# Patient Record
Sex: MALE | Race: OTHER | NOT HISPANIC OR LATINO | URBAN - METROPOLITAN AREA
[De-identification: names, ages, dates, MRNs, and addresses within clinical notes are randomized per-mention and may not be internally consistent; named-entity substitution may affect disease eponyms.]

---

## 2019-10-29 ENCOUNTER — EMERGENCY (EMERGENCY)
Facility: HOSPITAL | Age: 34
LOS: 1 days | Discharge: HOME | End: 2019-10-29
Admitting: EMERGENCY MEDICINE
Payer: MEDICAID

## 2019-10-29 VITALS
DIASTOLIC BLOOD PRESSURE: 68 MMHG | OXYGEN SATURATION: 97 % | SYSTOLIC BLOOD PRESSURE: 113 MMHG | HEIGHT: 70 IN | WEIGHT: 160.06 LBS | HEART RATE: 73 BPM | RESPIRATION RATE: 15 BRPM | TEMPERATURE: 98 F

## 2019-10-29 LAB
ALBUMIN SERPL ELPH-MCNC: 4.2 G/DL — SIGNIFICANT CHANGE UP (ref 3.4–5)
ALP SERPL-CCNC: 81 U/L — SIGNIFICANT CHANGE UP (ref 40–120)
ALT FLD-CCNC: 28 U/L — SIGNIFICANT CHANGE UP (ref 12–42)
AMYLASE P1 CFR SERPL: 68 U/L — SIGNIFICANT CHANGE UP (ref 25–115)
ANION GAP SERPL CALC-SCNC: 8 MMOL/L — LOW (ref 9–16)
APTT BLD: 32.5 SEC — SIGNIFICANT CHANGE UP (ref 27.5–36.3)
AST SERPL-CCNC: 19 U/L — SIGNIFICANT CHANGE UP (ref 15–37)
BASOPHILS # BLD AUTO: 0.06 K/UL — SIGNIFICANT CHANGE UP (ref 0–0.2)
BASOPHILS NFR BLD AUTO: 0.7 % — SIGNIFICANT CHANGE UP (ref 0–2)
BILIRUB SERPL-MCNC: 0.6 MG/DL — SIGNIFICANT CHANGE UP (ref 0.2–1.2)
BUN SERPL-MCNC: 27 MG/DL — HIGH (ref 7–23)
CALCIUM SERPL-MCNC: 8.9 MG/DL — SIGNIFICANT CHANGE UP (ref 8.5–10.5)
CHLORIDE SERPL-SCNC: 103 MMOL/L — SIGNIFICANT CHANGE UP (ref 96–108)
CO2 SERPL-SCNC: 28 MMOL/L — SIGNIFICANT CHANGE UP (ref 22–31)
CREAT SERPL-MCNC: 0.94 MG/DL — SIGNIFICANT CHANGE UP (ref 0.5–1.3)
EOSINOPHIL # BLD AUTO: 0.08 K/UL — SIGNIFICANT CHANGE UP (ref 0–0.5)
EOSINOPHIL NFR BLD AUTO: 1 % — SIGNIFICANT CHANGE UP (ref 0–6)
GLUCOSE SERPL-MCNC: 88 MG/DL — SIGNIFICANT CHANGE UP (ref 70–99)
HCT VFR BLD CALC: 46.6 % — SIGNIFICANT CHANGE UP (ref 39–50)
HGB BLD-MCNC: 16.9 G/DL — SIGNIFICANT CHANGE UP (ref 13–17)
IMM GRANULOCYTES NFR BLD AUTO: 0.2 % — SIGNIFICANT CHANGE UP (ref 0–1.5)
INR BLD: 1.21 — HIGH (ref 0.88–1.16)
LIDOCAIN IGE QN: 166 U/L — SIGNIFICANT CHANGE UP (ref 73–393)
LYMPHOCYTES # BLD AUTO: 1.89 K/UL — SIGNIFICANT CHANGE UP (ref 1–3.3)
LYMPHOCYTES # BLD AUTO: 22.8 % — SIGNIFICANT CHANGE UP (ref 13–44)
MCHC RBC-ENTMCNC: 32.2 PG — SIGNIFICANT CHANGE UP (ref 27–34)
MCHC RBC-ENTMCNC: 36.3 GM/DL — HIGH (ref 32–36)
MCV RBC AUTO: 88.8 FL — SIGNIFICANT CHANGE UP (ref 80–100)
MONOCYTES # BLD AUTO: 0.56 K/UL — SIGNIFICANT CHANGE UP (ref 0–0.9)
MONOCYTES NFR BLD AUTO: 6.7 % — SIGNIFICANT CHANGE UP (ref 2–14)
NEUTROPHILS # BLD AUTO: 5.69 K/UL — SIGNIFICANT CHANGE UP (ref 1.8–7.4)
NEUTROPHILS NFR BLD AUTO: 68.6 % — SIGNIFICANT CHANGE UP (ref 43–77)
NRBC # BLD: 0 /100 WBCS — SIGNIFICANT CHANGE UP (ref 0–0)
PLATELET # BLD AUTO: 174 K/UL — SIGNIFICANT CHANGE UP (ref 150–400)
POTASSIUM SERPL-MCNC: 4.1 MMOL/L — SIGNIFICANT CHANGE UP (ref 3.5–5.3)
POTASSIUM SERPL-SCNC: 4.1 MMOL/L — SIGNIFICANT CHANGE UP (ref 3.5–5.3)
PROT SERPL-MCNC: 7.3 G/DL — SIGNIFICANT CHANGE UP (ref 6.4–8.2)
PROTHROM AB SERPL-ACNC: 13.4 SEC — HIGH (ref 10–12.9)
RBC # BLD: 5.25 M/UL — SIGNIFICANT CHANGE UP (ref 4.2–5.8)
RBC # FLD: 11.7 % — SIGNIFICANT CHANGE UP (ref 10.3–14.5)
SODIUM SERPL-SCNC: 139 MMOL/L — SIGNIFICANT CHANGE UP (ref 132–145)
WBC # BLD: 8.3 K/UL — SIGNIFICANT CHANGE UP (ref 3.8–10.5)
WBC # FLD AUTO: 8.3 K/UL — SIGNIFICANT CHANGE UP (ref 3.8–10.5)

## 2019-10-29 PROCEDURE — 99284 EMERGENCY DEPT VISIT MOD MDM: CPT

## 2019-10-29 PROCEDURE — 74177 CT ABD & PELVIS W/CONTRAST: CPT | Mod: 26

## 2019-10-29 RX ORDER — SODIUM CHLORIDE 9 MG/ML
1000 INJECTION INTRAMUSCULAR; INTRAVENOUS; SUBCUTANEOUS ONCE
Refills: 0 | Status: COMPLETED | OUTPATIENT
Start: 2019-10-29 | End: 2019-10-29

## 2019-10-29 RX ORDER — IOHEXOL 300 MG/ML
30 INJECTION, SOLUTION INTRAVENOUS ONCE
Refills: 0 | Status: COMPLETED | OUTPATIENT
Start: 2019-10-29 | End: 2019-10-29

## 2019-10-29 RX ADMIN — SODIUM CHLORIDE 1000 MILLILITER(S): 9 INJECTION INTRAMUSCULAR; INTRAVENOUS; SUBCUTANEOUS at 19:22

## 2019-10-29 RX ADMIN — IOHEXOL 30 MILLILITER(S): 300 INJECTION, SOLUTION INTRAVENOUS at 19:22

## 2019-10-29 NOTE — ED ADULT NURSE NOTE - NSIMPLEMENTINTERV_GEN_ALL_ED
Implemented All Universal Safety Interventions:  [place] to call system. Call bell, personal items and telephone within reach. Instruct patient to call for assistance. Room bathroom lighting operational. Non-slip footwear when patient is off stretcher. Physically safe environment: no spills, clutter or unnecessary equipment. Stretcher in lowest position, wheels locked, appropriate side rails in place.

## 2019-10-29 NOTE — ED PROVIDER NOTE - NSFOLLOWUPINSTRUCTIONS_ED_ALL_ED_FT
Abdominal Pain    Many things can cause abdominal pain. Many times, abdominal pain is not caused by a disease and will improve without treatment. Your health care provider will do a physical exam to determine if there is a dangerous cause of your pain; blood tests and imaging may help determine the cause of your pain. However, in many cases, no cause may be found and you may need further testing as an outpatient. Monitor your abdominal pain for any changes.     SEEK IMMEDIATE MEDICAL CARE IF YOU HAVE ANY OF THE FOLLOWING SYMPTOMS: worsening abdominal pain, uncontrollable vomiting, profuse diarrhea, inability to have bowel movements or pass gas, black or bloody stools, fever accompanying chest pain or back pain, or fainting. These symptoms may represent a serious problem that is an emergency. Do not wait to see if the symptoms will go away. Get medical help right away. Call 911 and do not drive yourself to the hospital.     FOLLOW UP WITH THE GI SPECIALIST. CALL TOMORROW FOR AN APPOINTMENT. RETURN TO ER ANY NEW OR CONCERNING SYMPTOMS INCLUDING FEVER, CHILLS, VOMITING, DIARRHEA, ANY OTHER CONCERNS.

## 2019-10-29 NOTE — ED PROVIDER NOTE - PATIENT PORTAL LINK FT
You can access the FollowMyHealth Patient Portal offered by [facility] by registering at the following website: [email or web address]. By joining [employer]’s FollowMyHealth portal, you will also be able to view your health information using other applications (apps) compatible with our system.

## 2019-10-29 NOTE — ED PROVIDER NOTE - PROGRESS NOTE DETAILS
rechecked pt - his pain has improved somewhat. went over all results. pt given copy. abd soft, nontender. will d/c with GI f/u.

## 2019-10-29 NOTE — ED PROVIDER NOTE - OBJECTIVE STATEMENT
33 y/o male with no significant PMHx, on Finasteride, presents to ED c/o abd pain since yesterday. Patient reports diffuse abd pain that became localized mainly in the right side while at work today. Reports associated nausea but denies any fever, chills, vomiting, diarrhea, or blood in the stool. States he went to [facility] where he was given Toradol with some symptomatic relief and told to come to ED to r/o appendicitis. Patient denies any abd surgeries, no similar symptoms in the past. Endorses some constipation in the past few days.

## 2019-10-29 NOTE — ED PROVIDER NOTE - GASTROINTESTINAL, MLM
Right mid abdominal tenderness, normoactive bowel sounds in all 4 quadrants. minimal right mid abdominal tenderness, normoactive bowel sounds in all 4 quadrants.

## 2019-10-29 NOTE — ED PROVIDER NOTE - CLINICAL SUMMARY MEDICAL DECISION MAKING FREE TEXT BOX
33 y/o otherwise healthy male presents with generalized abd pain that has since localized to the right side with associated nausea. Patient sent by [facility] to r/o appendicitis, had normal UA at [facility]. Will obtain labs and CT abd/pelvis. Patient offered nausea meds but declined at this time. 35 y/o otherwise healthy male presents with generalized abd pain that has since localized to the right side with associated nausea. Patient sent by [facility] to r/o appendicitis, had normal UA at [facility]. Will obtain labs and CT abd/pelvis. Patient offered nausea meds but declined at this time.    pt feeling well. labs and ctap unremarkable. pt feeling improved. will d/c with GI f/u.

## 2019-10-29 NOTE — ED PROVIDER NOTE - CARE PROVIDER_API CALL
Bryan King; MBBS)  Internal Medicine  [address] [place] [address]  Phone: [phone]  Fax: [phone]  Follow Up Time:

## 2019-10-30 VITALS
OXYGEN SATURATION: 97 % | SYSTOLIC BLOOD PRESSURE: 100 MMHG | HEART RATE: 70 BPM | DIASTOLIC BLOOD PRESSURE: 70 MMHG | RESPIRATION RATE: 16 BRPM | TEMPERATURE: 98 F

## 2019-10-31 ENCOUNTER — INBOUND DOCUMENT (OUTPATIENT)
Age: 34
End: 2019-10-31

## 2019-10-31 PROBLEM — Z00.00 ENCOUNTER FOR PREVENTIVE HEALTH EXAMINATION: Status: ACTIVE | Noted: 2019-10-31

## 2019-11-04 DIAGNOSIS — R11.0 NAUSEA: ICD-10-CM

## 2019-11-04 DIAGNOSIS — R10.9 UNSPECIFIED ABDOMINAL PAIN: ICD-10-CM

## 2019-11-04 DIAGNOSIS — R10.31 RIGHT LOWER QUADRANT PAIN: ICD-10-CM

## 2019-12-09 ENCOUNTER — APPOINTMENT (OUTPATIENT)
Dept: GASTROENTEROLOGY | Facility: CLINIC | Age: 34
End: 2019-12-09

## 2021-01-03 ENCOUNTER — EMERGENCY (EMERGENCY)
Facility: HOSPITAL | Age: 36
LOS: 1 days | Discharge: HOME | End: 2021-01-03
Admitting: EMERGENCY MEDICINE
Payer: MEDICAID

## 2021-01-03 VITALS
WEIGHT: 164.91 LBS | DIASTOLIC BLOOD PRESSURE: 80 MMHG | TEMPERATURE: 98 F | HEART RATE: 81 BPM | OXYGEN SATURATION: 96 % | SYSTOLIC BLOOD PRESSURE: 123 MMHG | HEIGHT: 70 IN | RESPIRATION RATE: 18 BRPM

## 2021-01-03 DIAGNOSIS — M54.2 CERVICALGIA: ICD-10-CM

## 2021-01-03 PROBLEM — Z78.9 OTHER SPECIFIED HEALTH STATUS: Chronic | Status: ACTIVE | Noted: 2019-10-30

## 2021-01-03 PROCEDURE — 72040 X-RAY EXAM NECK SPINE 2-3 VW: CPT | Mod: 26

## 2021-01-03 PROCEDURE — 99284 EMERGENCY DEPT VISIT MOD MDM: CPT | Mod: 25

## 2021-01-03 RX ORDER — IBUPROFEN 200 MG
1 TABLET ORAL
Qty: 21 | Refills: 0
Start: 2021-01-03 | End: 2021-01-09

## 2021-01-03 RX ORDER — METHOCARBAMOL 500 MG/1
2 TABLET, FILM COATED ORAL
Qty: 30 | Refills: 0
Start: 2021-01-03 | End: 2021-01-07

## 2021-01-03 NOTE — ED ADULT TRIAGE NOTE - CHIEF COMPLAINT QUOTE
here for pain to neck radiating down to right hand x 1 month after lifting weights. Now reports tingling to right arm x 3 days

## 2021-01-03 NOTE — ED PROVIDER NOTE - CARE PROVIDERS DIRECT ADDRESSES
[email or web address],DirectAddress_Unknown

## 2021-01-03 NOTE — ED ADULT NURSE NOTE - NSIMPLEMENTINTERV_GEN_ALL_ED
Implemented All Universal Safety Interventions:  [place] to call system. Call bell, personal items and telephone within reach. Instruct patient to call for assistance. Room bathroom lighting operational. Non-slip footwear when patient is off stretcher. Physically safe environment: no spills, clutter or unnecessary equipment. Stretcher in lowest position, wheels locked, appropriate side rails in place.

## 2021-01-03 NOTE — ED PROVIDER NOTE - PROVIDER TOKENS
PROVIDER:[TOKEN:[[id number]]],PROVIDER:[TOKEN:[[id number]]],PROVIDER:[TOKEN:[[id number]]],PROVIDER:[TOKEN:[[id number]]]

## 2021-01-03 NOTE — ED PROVIDER NOTE - PHYSICAL EXAMINATION
FROM x4, pain over R side of neck and top of shoulder worsened on internal rotation, nontender, skin intact

## 2021-01-03 NOTE — ED PROVIDER NOTE - CARE PROVIDER_API CALL
Andrew Esquivel)  PhysicalRehab Medicine  [address] [place] [address]  Phone: [phone]  Fax: [phone]  Follow Up Time:     Selena Lancaster)  PhysicalRehab Medicine  [address] [place] [address]  Phone: [phone]  Fax: [phone]  Follow Up Time:     Soumya Grissom)  Neurosurgery  [address] [place] [address], [address]  [place] [address]  Phone: [phone]  Fax: [phone]  Follow Up Time:     Emigdio Stauffer)  Pain Medicine; PhysicalRehab Medicine  [address] [place] [address]  Phone: [phone]  Fax: [phone]  Follow Up Time:

## 2021-01-03 NOTE — ED PROVIDER NOTE - HEME LYMPH, MLM
No adenopathy or splenomegaly. No cervical or inguinal lymphadenopathy.
no diaphoresis/no dizziness/no back pain/no fever/no cough/no syncope/no nausea/no shortness of breath/no vomiting/no chills

## 2021-01-03 NOTE — ED PROVIDER NOTE - CLINICAL SUMMARY MEDICAL DECISION MAKING FREE TEXT BOX
Patient presents with R sided neck pain with radiations to R arm, atraumatic, + repetative stress. xray WNL. rx sent for NSAIDs, robaxin. advised follow up with PM&R and PCP. all precautions reviewed.

## 2021-01-03 NOTE — ED PROVIDER NOTE - NSFOLLOWUPINSTRUCTIONS_ED_ALL_ED_FT
Patient education: Neck pain (Beyond the Basics)  Authors:Olimpia Mcmahan MDSection :Gertrude Nguyen MD, MPHDeputy :Anjana Barnes MD  Contributor Disclosures    All topics are updated as new evidence becomes available and our peer review process is complete.  Literature review current through: Dec 2020. | This topic last updated: Sep 03, 2020.  NECK PAIN OVERVIEW  Neck pain can be caused by a number of factors, including muscle or ligament strains, arthritis, or a "pinched" nerve (when a nerve is irritated by something pressing on it). Approximately 10 percent of adults have neck pain at any one time. Most of the time, regardless of the cause, pain improves with conservative therapy.    Neck pain is often categorized as "acute" (lasting less than six weeks), "subacute" (lasting 6 to 12 weeks), or "chronic" (lasting more than 12 weeks). While most episodes of acute pain resolve quickly, some people do go on to have longer-term pain.    This topic review discusses the causes, evaluation, and available treatments for the most common types of neck pain.    ANATOMY OF THE NECK  The neck, or cervical spine, is formed by seven square-shaped bones (cervical vertebrae), which are stacked one on top of another (figure 1). The vertebrae are named for their position in the neck, beginning at the top with C1, C2, C3, and down to C7. The skull and upper cervical vertebrae are uniquely connected to allow for movement of the skull. Together with the supporting ligaments and the overlying long neck muscles, the cervical vertebrae form a strong spinal canal that surrounds and protects the spinal cord.    Between the neck bones are discs, which function as shock absorbers, providing cushioning between the vertebrae. If there is excessive pressure on a disc, this can cause the inner jelly-like material to protrude through its outer capsule. This is called a herniated or "slipped" disc. If a herniated disc irritates a nerve root, it can cause symptoms. Other words used to describe a disc problem include "bulging," "protruding," or "ruptured." (See 'Cervical radiculopathy' below.)    Directly covering the bones and the discs is a dense layer of ligaments. These thick tissues attach directly to the bones and function to limit the movement of the cervical vertebrae against each other. Car accidents or other trauma to the head or body can cause injury to these ligaments. This "whiplash" movement of the neck can range in severity from minor bruising to complete tear and separation of ligament from the bone. (See 'Whiplash injury' below.)    The major muscles of the neck form the next protective layer (figure 2). These muscles are responsible for holding the head upright, maintaining normal posture, and supporting and moving the neck. Overuse and irritation of these muscles are generically referred to as neck strain or muscle tension. These problems can sometimes contribute to "muscle tension" headaches. There are also small muscles between the vertebrae.    NECK PAIN CAUSES  There are many possible causes of neck pain, although it is often difficult to know with certainty what is causing pain. This is because the physical examination, and even imaging tests, are not able to easily differentiate among the various potential causes. In addition, degenerative bone and joint changes visible on imaging tests often do not correlate with the severity of pain. In other words, it is possible to have neck pain with relatively normal imaging tests, or to have no (or only mild) pain despite significant abnormalities on imaging.    Cervical strain — Cervical muscle strain can occur when there is an injury to the muscles of the neck, causing spasm (sudden tightening) of the cervical and upper back muscles. Cervical strain may result from the physical stresses of everyday life, including poor posture, muscle tension from psychologic stress, or poor sleeping habits. Sports-related injuries can also result in cervical strain. Typically, cervical strain symptoms include pain, stiffness, and tightness in the upper back or shoulder, which may last for up to six weeks.    Cervical spondylosis — Cervical spondylosis is a condition caused by abnormal wear and tear (called degenerative changes) of the cervical spine. This leads to gradual narrowing of the disc space, a loss of the normal square-shaped bone, and bone spurs (growths at the edges of the bones). These spurs can increase pressure on the surrounding tissues and lead to pinched nerves. Some degree of wear and tear is expected with normal aging, although severe degenerative changes are not typical.    Symptoms of cervical spondylosis can include neck pain or weakness, numbness or abnormal sensations of the arms or shoulders, headaches, or limited ability to move the neck. Ear pain has also been associated with upper cervical spondylosis in some cases.    Cervical discogenic pain — Cervical discogenic pain may be the most common cause of neck pain. It is caused by degenerative changes in the structure of one or more of the discs in between the cervical vertebrae. Common symptoms of discogenic pain include pain in the neck when turning or tilting the head. Pain may be worsened when the neck is held in one position for prolonged periods, such as occurs with driving, reading, or working at a computer. There is often associated muscle tightness and spasms. Discogenic pain can also cause referred pain or odd sensations into the arm or shoulder.    Cervical facet syndrome — The facet joints are located on sides of the vertebrae, and arthritis in this area can cause pain in the middle or side of the neck; some people also notice pain in the shoulders, around the shoulder blades, at the base of the head, into the ear and jaw, or in one arm. A common cause of cervical facet syndrome includes a job that requires a person to repeatedly extend the neck (tilt the head backwards).    Whiplash injury — "Whiplash" is a term used to describe injury caused by a traumatic event that causes an abrupt forward/backward movement of the neck. The most common cause of whiplash is a motor vehicle accident. A similar type of injury can occur in sports, when there is a significant blow to the body and the neck has to control the motion of the head. Whiplash can affect many of the structures in the neck, including the muscles, ligaments, and joints. Symptoms of whiplash can include severe pain, spasm, headaches, ear pain, jaw pain, and loss of range of motion in the neck.    Cervical myofascial pain — Myofascial pain is a disorder that causes tight and tender areas of muscle that are sensitive to pressure. Myofascial pain in the neck can develop after trauma or with other conditions, such as psychologic stress, depression, or insomnia.    Diffuse skeletal hyperostosis — Diffuse skeletal hyperostosis (DISH) is a syndrome in which there are abnormal calcifications (bone deposition) in the ligaments and tendons along the cervical spine, causing these tissues to become hardened. Many people with DISH have no symptoms while others develop stiffness, loss of mobility, and pain. This condition can also involve the thoracic (middle) and lumbar (lower) spine.    Cervical spondylotic myelopathy — Cervical spondylotic myelopathy occurs when there are degenerative changes that narrow the central spinal canal. This narrowing can injure the cervical spinal cord or cause it to function improperly. In addition to pain and loss of motion, signs of cervical spondylotic myelopathy may include a variety of neurologic complaints. Weakness, difficulty walking or coordinating movement, inability to empty or control the bowels or bladder, and sexual dysfunction (erectile dysfunction) may occur as a result of irritation of the spinal cord.    Cervical radiculopathy — Cervical radiculopathy occurs when a nerve root is irritated by something pressing on it (a protruding disc, arthritis of the spine, or a mass or cyst). Symptoms of radiculopathy can include pain, weakness, or changes in sensation (such as numbness or tingling) in the arms. The most common causes of cervical radiculopathy include:    ?Degenerative changes related to aging or injury    ?Herniation of a cervical disc    TESTS TO EVALUATE NECK PAIN  If you have persistent neck pain, you should see your health care provider for evaluation. They can try to determine the cause of your pain and recommend treatment. You should also see your provider right away if you have severe pain, experienced a serious head or neck injury, lose the ability to control your bowels or bladder, or have numbness or changes in sensation in your arms or legs.    Your provider will observe your ability to move your head to the left and right, forward and backward, and side to side. They will also observe your posture and the movement of the neck and shoulders, and feel the muscles in your neck, head, upper back, and shoulders to detect areas of pain or tension. They may conduct strength, sensation, and reflex testing as well.    In some cases, imaging tests (such as an X-ray, computed tomography [CT] scan, or magnetic resonance imaging [MRI]), or tests to check the muscles and nerves (such as electromyography [EMG]) will be recommended. Whether you need tests and which tests are recommended depends on your age, symptoms, medical history, and examination.    CONSERVATIVE TREATMENTS FOR NECK PAIN  In most cases, neck pain can be treated conservatively with over-the-counter pain medications, ice, heat, massage, and strengthening and/or stretching exercises at home. If you still have pain or restrictions in movement restrictions after a few weeks of conservative treatment, see your health care provider for further evaluation.    Pain relief — Acetaminophen (sample brand name: Tylenol) or a nonsteroidal antiinflammatory medication (NSAID) such as ibuprofen (sample brand names: Advil, Motrin) or naproxen (sample brand names: Aleve, Naprosyn) may help relieve mild to moderate neck pain. (See "Patient education: Nonsteroidal antiinflammatory drugs (NSAIDs) (Beyond the Basics)".)    Ice — For some people, ice applied to the sore area can help relieve neck pain. In general, for acute injuries, ice is recommended as the initial treatment, especially if swelling is present.    You can also relieve muscle tightness by placing a bag of ice, bag of frozen peas, or a frozen towel to the painful area. The cold source should be wrapped in a thin dry  before it is placed on the neck to protect the skin. The ice or cold pack should be left in place for 15 to 20 minutes to deeply penetrate the tissues; this can be repeated every two to four hours until symptoms improve.    Skin damage can result from excessive use of ice, especially in people with poor skin sensation; it's a good idea to inspect your skin each time you apply ice. Look for changes in pigmentation (for example, lighter- or darker-colored areas) and let your health care provider know if you notice any problems.    Heat — Heat can also help to reduce neck pain. Apply moist heat for 10 to 15 minutes with a shower, hot bath, or moist towel warmed in a microwave. If you use a heated towel, be careful not to overheat, as this can cause injury.    Stretching exercises — You can help restore and preserve your range of motion with exercises that stretch and strengthen the neck muscles. Range of motion exercises and stretching may help decrease pain from muscle injury. It is best to perform stretching exercises when the muscles are warm, such as after the application of heat, or after a few minutes of cardiovascular warm-up exercises.    Exercises can be done in the morning to relieve stiffness and again at night before going to bed. Expect mild, achy muscle pain if you are new to exercise. If you have sharp or "electric" type pain in your shoulder or arm, tell your provider right away.    If you have had a serious neck injury, don't try any new exercises or stretches before talking to your health care provider. Also, do not attempt these exercises if you have a pinched nerve in your neck, especially if there is pain or numbness into the arm and hand, unless recommended by a health care provider. While these exercises can dramatically improve symptoms of a pinched nerve, they can actually make the problem worse if done improperly.    Before doing stretching or strengthening exercises, it's a good idea to warm your neck and upper back muscles by taking a warm bath or shower or applying a heating pad or moist, warm towel. Exercises are most effective if you do them twice a day, in the morning and before bed.    Exercises include:    ?Neck rotation – Slowly look to the right. Hold for a few seconds. Look straight ahead and rest for a few seconds. Repeat 10 to 15 times, then perform on the left side.    ?Neck tilting – Look straight forward, then tilt the top of your head to the right, trying to touch your right ear to your right shoulder (without raising your shoulder). Hold for a few seconds, then return your head to the center. Repeat 10 to 15 times, then perform on the left side.    ?Neck bending – Tilt your head forward and try to touch your chin to your neck using a nodding motion. Hold for a few seconds, breathe in gradually, and exhale slowly with each exercise. Exhaling with the movement helps relax the muscles. Repeat 10 to 15 times. Relax your neck and back muscles with each neck bend.    ?Shoulder rolls – In a sitting or standing position, hold your arms at your sides with the elbows bent. Try to squeeze your shoulder blades together. Roll your shoulders backwards 10 to 15 times, moving in a rhythmic, rowing motion. Proper neck alignment posture is important during this exercise. Rest, then roll your shoulders forwards 10 to 15 times.    ?Scapular retraction – While seated, hold your head in a neutral position and draw your shoulder blades backward (toward each other). Hold for 10 seconds, then relax. Repeat 10 to 15 times.    ?Deep neck flexor strengthening – Lie on your back, then draw your chin down (toward your chest) and inwards while jeferson the deep muscles of your neck. Hold for five seconds, then relax. Repeat 10 to 15 times.    ?Chest wall stretches –  a doorway and hold your arms out to your sides with your elbows bent and your palms facing out. Place your elbows against the door frame, slightly below shoulder level, then lean forward slowly to stretch your shoulder and chest muscles. Hold the stretch for 10 to 30 seconds, and repeat 10 to 15 times.    Stress reduction — Emotional stress can increase neck tension and interfere with or delay the recovery process. Reducing stress may help to prevent neck pain from recurring. Relaxation techniques can relieve musculoskeletal tension. An example of a relaxation exercise is to take a deep breath in, hold it for a few seconds, and then exhale completely. Breathe normally for a few seconds, and then repeat.    Other activities that may help to reduce stress include meditation, mindfulness-based stress reduction programs, cognitive behavioral therapy, or progressive muscle relaxation.    Posture — You can help prevent or reduce neck pain by doing activities and using positions that emphasize a neutral neck position and minimize tension across the supporting muscles and ligaments. Extremes of range of motion and positions that cause constant tension should be minimized or avoided.    It may help to:    ?Avoid sitting in the same position for prolonged periods of time. If you work at a desk, try to take periodic five-minute breaks throughout the day. Avoid looking up or down at a computer monitor; adjust it to your eye level. Visual changes or new glasses prescriptions can also affect the way you hold your head and neck to read.    ?Avoid putting pressure on your upper back with things like backpacks, over-the-shoulder purses, or carrying children. Alternatives may include wheeled backpacks or cases, handbags, and having a child walk or ride in a stroller.    ?Avoid doing overhead work for prolonged periods at a time.    ?Maintain good posture by holding your head up and keeping your shoulders back and down. Try to keep your neck in line with your body and avoid hunching forward. Pay attention to your neck and arm position when you are using your phone.    ?Use the car or chair arm rests to keep your arms supported. Find the optimal seated alignment in your car for your entire spine and make sure the head rest is at a comfortable position for your neck.    ?Sleep with your neck in a neutral position by using a small pillow under the nape of your neck (if you sleep on your back) or sleeping with enough pillows to keep your neck straight in line with your body (if you sleep on your side). If you sleep on your back, putting a pillow under the knees can help to flatten the spine and relax the neck muscles. Avoid sleeping on your stomach with your head turned to one side.    ?Carry heavy objects close to your body rather than with outstretched arms.    OTHER TREATMENTS  Many other treatments have been studied for neck pain. Some have limited evidence for benefit, while others are appropriate in certain situations (such as people with severe or chronic pain or certain types of neck injury). If conservative treatments are not effective, your health care provider might suggest trying one or more of the following.    Prescription pain medications — As discussed above, treatment with an over-the-counter pain reliever such as a nonsteroidal antiinflammatory drug (NSAID) or acetaminophen may be enough to reduce your neck pain. (See 'Pain relief' above.)    If your pain is not relieved with NSAIDs or acetaminophen, or if you have severe muscle spasm, your doctor might prescribe a muscle relaxant. If this is not sufficient and your pain continues to be severe, they may also consider prescribing tramadol, a stronger pain medication, for a short time. Because tramadol is an opioid medication, it is associated with the risk of dependence or addiction; for this reason, health care providers are cautious about prescribing it and often start with the smallest dose for the shortest time possible.    If pain continues beyond six weeks despite treatment with tramadol (or in people who cannot or should not take tramadol), or if pain is disrupting sleep, certain antidepressant medications may be recommended.    Add-on treatments that may be helpful — While there is limited evidence for therapies like massage and acupuncture in the treatment of neck pain, some people find that they are helpful (at least in the short term), and they are unlikely to be harmful.    Massage — Massage can be helpful for relieving muscle spasm. There are different types of massage, and people have different preferences about the technique and amount of pressure used. If you want to try massage, it's best to seek out a licensed massage therapist with experience in treating people with neck pain.    Acupuncture — Acupuncture involves inserting very fine needles into specific points, as determined by traditional Chinese maps of the body's flow of energy. Many acupuncturists have experience in treating people with neck pain; if you would like to try this, your health care provider may be able to recommend someone.    Movement-based therapy — Depending on your situation, your health care provider might recommend working with a physical therapist to develop an individualized exercise program. This typically involves a combination of stretching, strengthening, and stabilizing exercises. A physical therapist can teach you exercises that you can continue to do at home between treatments.    Some people find activities with a mind-body focus, such as Gonzalo Chi and yoga, to be beneficial. Whatever exercise program you follow, the goal is not only to improve pain but to help you regain function and be able to do your normal daily activities. In addition, staying active can help reduce stress and anxiety.    Spinal manipulation and other manual therapy — "Spinal manipulation" is a technique sometimes used by chiropractors, physical therapists, doctors, massage therapists, and others to relieve neck or back pain; it can be effective when used in conjunction with exercise. Manual therapy and joint mobilization are gentle types of manipulation that can help improve joint and soft tissue movement and flexibility.    While some people find that spinal manipulation helps with their pain, some studies have found that treatments that include quick "thrusting" of the neck are associated with a possible risk of serious injury. Aggressive manipulation or adjustments to the cervical spine should be avoided in people with certain conditions including cervical stenosis, cervical spondylotic myelopathy, or cervical radiculopathy.    Sometimes, providers use tools to apply deeper pressure to the soft tissues to treat these areas. Occasionally, they may use an invasive approach involving a small needle, called "dry needling." This type of dry needling is not related to injections or acupuncture. There is no good evidence that these approaches alleviate pain or speed healing in the long term.    If you are interested in trying spinal manipulation, talk with your health care provider to make sure it is appropriate for you.    Trigger point injections — This involves injecting a local anesthetic medication (such as lidocaine) into the muscles at specific areas of tenderness.    Trigger point injections may be recommended if conservative treatments do not significantly improve pain. However, there is no good evidence that trigger point injections help to reduce pain or speed healing in the long term. Trigger point injections with steroids into the neck muscles are not recommended due to the risk of injury to the muscles and lack of additional benefit.    Psychological and mind-body therapies — People with chronic neck pain may benefit from something called cognitive behavioral therapy (CBT). CBT involves learning about your condition (including addressing any misconceptions and fears that you may have about your pain), learning how to develop a more positive way of thinking, and setting activity goals and working toward those goals. CBT techniques may be performed by a psychologist, physical therapist, or other trained professional.    Some people also find that something called "mindfulness-based stress reduction" helps. This involves attending a group program to practice relaxation and meditation techniques with someone trained in this approach. It may be helpful in improving your ability to relax, cope with stress, and help manage your pain.    Treatments not routinely recommended — You may have heard of other treatments that claim to relieve neck pain. Unfortunately, most of these have not been proven to work or are only effective in specific situations. Examples include:    ?Cervical collar – A soft cervical collar is a piece of foam covered with fabric that is worn around the neck to support the head. While occasional brief use of a collar may be helpful in certain situations (for example, if you have periods of increased pain that interfere with sleep), long-term or routine use is discouraged as this may delay recovery or allow the neck muscles to weaken.    ?Cervical traction – Cervical traction involves the use of weights to realign or pull the spinal column into alignment. Clinical studies have shown no benefit from traction in the treatment of neck pain.    ?Electrical stimulation – Transcutaneous electrical nerve stimulation (TENS) is a treatment that involves applying a mild electric current to the skin with the goal of decreasing pain and increasing mobility and strength. Evidence for its efficacy is mixed; your health care provider can talk to you about this approach if you are interested in trying it.    ?Surgery – Surgery is not useful for treating most types of neck pain. However, surgery may have a role in relieving symptoms related to a pinched nerve caused by a herniated disc (cervical spondylotic myelopathy or cervical radiculopathy) if conservative therapy is not effective. (See 'Cervical spondylotic myelopathy' above and 'Cervical radiculopathy' above.)    If you have chronic neck pain that does not improve over time, your provider may refer you to a spine or pain specialty clinic.    If you want to try any new form of treatment, or have questions about approaches not discussed here, it is always best to talk with your provider first. They can assess your situation and recommend treatments that have been proven to be effective.    WHERE TO GET MORE INFORMATION  Your healthcare provider is the best source of information for questions and concerns related to your medical problem.

## 2021-01-03 NOTE — ED ADULT TRIAGE NOTE - BMI (KG/M2)
Patient is asking for a return to the above number.  Patient had delivered her baby on Tuesday and wanted to see how Dr wanted to proceed with her medication(s).   23.7

## 2021-01-03 NOTE — ED PROVIDER NOTE - PATIENT PORTAL LINK FT
You can access the FollowMyHealth Patient Portal offered by [facility] by registering at the following website: [email or web address]. By joining [employer]’s FollowMyHealth portal, you will also be able to view your health information using other applications (apps) compatible with our system.

## 2021-01-03 NOTE — ED PROVIDER NOTE - OBJECTIVE STATEMENT
Patient presents with worsened R sided neck pain with radiation to the R arm x one month. denies focal weakness. pain has been severe over the past 3 days and has prevented him from sleeping. worsened on turning the neck and lying down. admits to repeatative stress bench presses 130-200lbs. patient had been taking percocet and advil 400mg without relief.

## 2021-01-13 ENCOUNTER — APPOINTMENT (OUTPATIENT)
Dept: PHYSICAL MEDICINE AND REHAB | Facility: CLINIC | Age: 36
End: 2021-01-13
Payer: MEDICAID

## 2021-01-13 VITALS — TEMPERATURE: 97.8 F | HEIGHT: 70 IN | RESPIRATION RATE: 16 BRPM | WEIGHT: 164 LBS | BODY MASS INDEX: 23.48 KG/M2

## 2021-01-13 DIAGNOSIS — Z78.9 OTHER SPECIFIED HEALTH STATUS: ICD-10-CM

## 2021-01-13 PROCEDURE — 99072 ADDL SUPL MATRL&STAF TM PHE: CPT

## 2021-01-13 PROCEDURE — 99204 OFFICE O/P NEW MOD 45 MIN: CPT

## 2021-01-13 RX ORDER — IBUPROFEN 800 MG/1
TABLET, FILM COATED ORAL
Refills: 0 | Status: ACTIVE | COMMUNITY

## 2021-01-13 RX ORDER — EMTRICITABINE AND TENOFOVIR DISOPROXIL FUMARATE 167; 250 MG/1; MG/1
TABLET, FILM COATED ORAL
Refills: 0 | Status: ACTIVE | COMMUNITY

## 2021-01-13 NOTE — ASSESSMENT
[FreeTextEntry1] : Impression:\par 1. Right C6 Radiculopathy\par \par Plan: The history, physical examination, and imaging were reviewed. Symptoms are consistent with Right C6 Radiculopathy. The imaging results and diagnosis were discussed with the patient along with treatment options including physical therapy, oral medication, interventional spine procedures, and surgery; as well as alternative therapeutics such as acupuncture and massage. We also discussed the importance of activity modification, ergonomics, and posture in the long term management of the condition.  At this time the patient has been doing conservative treatment and is interested in interventional options for symptom reduction. We will plan for Right C7-T1 ILESI. We discussed all the risks, benefits, alternative treatments, and prognosis. The patient expressed understanding and would like to move forward. We will schedule for the injection as well as follow up post-procedure. The patient expressed verbal understanding and is in agreement with the plan of care. All of the patient's questions and concerns were addressed during today's visit.\par \par

## 2021-01-13 NOTE — DATA REVIEWED
[FreeTextEntry1] :  CS 01/2021: C5-6 central right-sided disc herniation with compression of the right C6 nerve root.

## 2021-01-13 NOTE — PHYSICAL EXAM
[FreeTextEntry1] : CERVICAL\par STRENGTH: 5/5 bilateral shoulder abductors, elbow flexors, elbow extensors, wrist extensors, hand intrinsics, long finger flexors to D3 and D5.\par TONE: Normal, No clonus.\par REFLEXES: 2+ symmetric biceps, triceps, brachioradialis, pronator teres. Naik's negative bilaterally.\par SENSATION: Grossly intact to light touch bilateral upper extremities.\par INSP: Spine alignment is midline, with no evidence of scoliosis.\par CERVICAL ROM: Flexion, extension, side-bending, rotation, oblique extension all full and pain free.  \par PALP: There is no tenderness over the midline spinous processes, paravertebral muscles, trapezius, levator scapulae or shoulder region bilaterally.\par SPECIAL: Spurling's maneuver negative bilaterally. Axial Compression negative. Lhermitte's sign negative.\par \par SHOULDER\par INSP: No Atrophy, Bony Deformity, Swelling. Shoulder height symmetric. Normal Thoracic Kyphosis.\par Shoulder AROM: Full and pain free. Painful Arc negative bilaterally. No crepitus. No scapulothoracic dyskinesia or winging. PROM IR/ER full and pain free. \par PALP: No tenderness anterior, lateral, or posterior compartments.\par SPECIAL: Neers, Jade, Jae, Empty Can, Speed’s, Drop arm negative bilaterally.\par

## 2021-01-13 NOTE — HISTORY OF PRESENT ILLNESS
[FreeTextEntry1] : Mr. OKSANA SALGADO is a very pleasant 35 year male who comes in for evaluation of neck pain that has been ongoing for a 2 months without any specific injury or inciting event. The patient has been doing PT/Acupuncture, Chiropractics, Oral Steroids, NSAIDs and muscle relaxers with somewhat improvement. The pain is located primarily on his neck radiating to his right arm intermittent in nature and described as sharp. The pain is rated as 4/10 and ranges from 3-10/10. The patient's symptoms are aggravated by lifting weights and alleviated by Ibuprofen. The patient feels numbness but denies any night pain, tingling, weakness, or bowel/bladder dysfunction. The patient has no other complaints at this time.

## 2021-01-23 ENCOUNTER — EMERGENCY (EMERGENCY)
Facility: HOSPITAL | Age: 36
LOS: 1 days | Discharge: HOME | End: 2021-01-23
Admitting: EMERGENCY MEDICINE
Payer: MEDICAID

## 2021-01-23 ENCOUNTER — TRANSCRIPTION ENCOUNTER (OUTPATIENT)
Age: 36
End: 2021-01-23

## 2021-01-23 VITALS
TEMPERATURE: 99 F | OXYGEN SATURATION: 98 % | HEART RATE: 90 BPM | SYSTOLIC BLOOD PRESSURE: 152 MMHG | DIASTOLIC BLOOD PRESSURE: 82 MMHG | RESPIRATION RATE: 18 BRPM | HEIGHT: 70 IN

## 2021-01-23 DIAGNOSIS — Z20.822 CONTACT WITH AND (SUSPECTED) EXPOSURE TO COVID-19: ICD-10-CM

## 2021-01-23 PROCEDURE — 99283 EMERGENCY DEPT VISIT LOW MDM: CPT

## 2021-01-23 NOTE — ED PROVIDER NOTE - PATIENT PORTAL LINK FT
You can access the FollowMyHealth Patient Portal offered by [facility] by registering at the following website: [email or web address]. By joining [employer]’s FollowMyHealth portal, you will also be able to view your health information using other applications (apps) compatible with our system.

## 2021-01-23 NOTE — ED ADULT TRIAGE NOTE - CHIEF COMPLAINT QUOTE
Patient requesting COVID-19 testing; patient is currently asymptomatic with no recent travel or know exposure

## 2021-01-23 NOTE — ED PROVIDER NOTE - OBJECTIVE STATEMENT
35 year old male presents for COVID swab. asymptomatic  Denies sore throat, cough, SOB, CP, palpitations, wheezing, abdominal pain, N/V/D/C, change in urinary/bowel function, dysuria, hematuria, flank pain, malaise, rash, HA, and dizziness.  No recent travel or sick contact noted.

## 2021-01-24 LAB — SARS-COV-2 RNA SPEC QL NAA+PROBE: SIGNIFICANT CHANGE UP

## 2021-01-26 ENCOUNTER — APPOINTMENT (OUTPATIENT)
Dept: PHYSICAL MEDICINE AND REHAB | Facility: CLINIC | Age: 36
End: 2021-01-26
Payer: MEDICAID

## 2021-01-26 DIAGNOSIS — M50.10 CERVICAL DISC DISORDER WITH RADICULOPATHY, UNSPECIFIED CERVICAL REGION: ICD-10-CM

## 2021-01-26 PROCEDURE — 62321 NJX INTERLAMINAR CRV/THRC: CPT

## 2021-02-15 ENCOUNTER — EMERGENCY (EMERGENCY)
Facility: HOSPITAL | Age: 36
LOS: 1 days | Discharge: HOME | End: 2021-02-15
Admitting: EMERGENCY MEDICINE
Payer: MEDICAID

## 2021-02-15 VITALS
SYSTOLIC BLOOD PRESSURE: 126 MMHG | DIASTOLIC BLOOD PRESSURE: 81 MMHG | HEIGHT: 70 IN | TEMPERATURE: 98 F | RESPIRATION RATE: 18 BRPM | HEART RATE: 84 BPM | OXYGEN SATURATION: 98 %

## 2021-02-15 DIAGNOSIS — Z20.822 CONTACT WITH AND (SUSPECTED) EXPOSURE TO COVID-19: ICD-10-CM

## 2021-02-15 PROCEDURE — 99282 EMERGENCY DEPT VISIT SF MDM: CPT

## 2021-02-15 NOTE — ED PROVIDER NOTE - OBJECTIVE STATEMENT
34 y/o male presents to the ED requesting COVID-19 testing. Patient is currently asymptomatic and has no other medical complaints at this time. Denies fever, chills, chest pain, SOB, cough.

## 2021-02-15 NOTE — ED PROVIDER NOTE - NSFOLLOWUPINSTRUCTIONS_ED_ALL_ED_FT
THE COVID 19 TEST RESULTS  - results may take up to 2-3 days to become available   - if you have consented, you will receive your results electronically   -  you can check [employer] DONELL or call [phone] to discuss your results with our nursing staff    Please continue to self quarantine (home isolation) until your result is back and follow instructions accordingly  - positive: complete home isolation for a total of 14 days since day of testing and no more fever with feeling back to baseline   - negative: you will be able to stop home isolation but still practice standard precautions, similar to how you would manage a regular flu/cold.    Return to ER for any worsening symptoms, such as persistent fever >100.4F, shortness of breath, coughing up bloody sputum, chest pain, lethargy, and fainting    Please remember to wash your hands frequently (>20 seconds each time), avoid touching your face, and cover your cough/sneeze.  Always wear a mask when you are outside of your home and practice social distancing.    Only take Tylenol for fever/pain control and avoid NSAIDs (ibuprofen/Advil/Aleve/naproxen) due to potential increased risk of exacerbating COVID-19 infection

## 2021-02-15 NOTE — ED PROVIDER NOTE - PATIENT PORTAL LINK FT
You can access the FollowMyHealth Patient Portal offered by [facility] by registering at the following website: [email or web address]. By joining [employer]’s FollowMyHealth portal, you will also be able to view your health information using other applications (apps) compatible with our system.

## 2021-02-16 LAB — SARS-COV-2 RNA SPEC QL NAA+PROBE: SIGNIFICANT CHANGE UP

## 2021-05-05 NOTE — ED ADULT NURSE NOTE - CAS DISCH ACCOMP BY
Pathologic Diagnosis   Skin, left medial cheek, shave biopsy:   -Basal cell carcinoma, nodular type, present at deep edge of specimen.     Spoke with patient regarding biopsy diagnosis of BCC left cheek and the need to have Mohs surgery to remove the remaining skin cancer.    Per patient he has had Mohs surgery before.   Mohs surgery and healing options explained in detail to patient. Patient states he will likely let it heal on it's own.   Patient verbalized good understanding and had no further questions.  No pre-op needed.   Mohs surgery scheduled for 6/8/21 at 10:00 am.  Letter sent.      
Spouse

## 2023-07-05 ENCOUNTER — NON-APPOINTMENT (OUTPATIENT)
Age: 38
End: 2023-07-05

## 2023-07-06 ENCOUNTER — APPOINTMENT (OUTPATIENT)
Dept: UROLOGY | Facility: CLINIC | Age: 38
End: 2023-07-06
Payer: MEDICAID

## 2023-07-06 VITALS
TEMPERATURE: 98.3 F | WEIGHT: 160 LBS | HEIGHT: 71 IN | HEART RATE: 85 BPM | DIASTOLIC BLOOD PRESSURE: 83 MMHG | OXYGEN SATURATION: 97 % | SYSTOLIC BLOOD PRESSURE: 134 MMHG | BODY MASS INDEX: 22.4 KG/M2

## 2023-07-06 LAB
BILIRUB UR QL STRIP: NORMAL
CLARITY UR: CLEAR
COLLECTION METHOD: NORMAL
GLUCOSE UR-MCNC: NORMAL
HCG UR QL: 0.2 EU/DL
HGB UR QL STRIP.AUTO: NORMAL
KETONES UR-MCNC: NORMAL
LEUKOCYTE ESTERASE UR QL STRIP: NORMAL
NITRITE UR QL STRIP: NORMAL
PH UR STRIP: 6.5
PROT UR STRIP-MCNC: NORMAL
SP GR UR STRIP: 1.02

## 2023-07-06 PROCEDURE — 99204 OFFICE O/P NEW MOD 45 MIN: CPT

## 2023-07-06 RX ORDER — PAPAVERINE HYDROCHLORIDE 30 MG/ML
30 INJECTION, SOLUTION INTRAVENOUS
Qty: 2 | Refills: 0 | Status: ACTIVE | COMMUNITY
Start: 2023-07-06 | End: 1900-01-01

## 2023-07-06 RX ORDER — TADALAFIL 5 MG/1
5 TABLET ORAL
Qty: 90 | Refills: 3 | Status: ACTIVE | COMMUNITY
Start: 2023-07-06 | End: 1900-01-01

## 2023-07-06 NOTE — ASSESSMENT
[FreeTextEntry1] : We discussed the physical findings today and how they indicate likely Peyronie's disease.  I discussed options with the patient to maximize his ability to function normally.  I also discussed with the patient a realistic outcome for any therapeutic intervention and that it is impossible to completely reverse any effects on the penis by methods that are available today.  We will maintain our outcome as the  return of the ability to have satisfactory penetrative intercourse.  To this end, I offered to potential paths.  The first 1 is to initiate treatment with tadalafil 5 mg daily to improve erectile rigidity and increased penile blood flow 24 hours a day.  I recommended that he try this for 3 months and then we will reassess.  The second approach would be to proceed now to a penile duplex ultrasound examination to assess in more detail his plaque, erectile curvature, erectile rigidity, and any penile calcification.  We also discussed the possibility of proceeding down both paths at the same time and he would like to do that.  He was instructed that he should cease taking any oral PDE 5 RI medication for 1 week prior to having the duplex ultrasound examination performed.  Medication was ordered and plans were made for him to come for this test in the meanwhile we initiated therapy with the tadalafil 5 mg daily at this point.  We reviewed together the indications risks alternatives and chances for success with each of these approaches individually and collectively in detail.  Esperanza Villasenor MD\par

## 2023-07-06 NOTE — HISTORY OF PRESENT ILLNESS
[FreeTextEntry1] : 37 YO M seen TODAY 7/6/2023 as NPT for Peyronie's Disease. He has noted a change in his penis over the last 3 - 6 months in that it does not get as hard or as large with erections. This has caused him to not be able to enjoy penetrative intercourse (anal with his male partner). He also notes some nodular changes and narrowing in the distal shaft with mild penile angulation, no pain.  He has not tried any medication, either oral or injectable for the PD or for ED. \par \par PAtient is also treated intermittently for TRT and has subsequent elevated RBC production. He treats this either by phlebotomy or by donating blood. He self medicates with ASA 81 mg due to this issue. Patient also uses HCG injections weekly to protect his endogenous T production, ad uses finasteride and Minoxidil for hair growth.\par NKMA, No FH or prostate cancer. \par UA negative\par IPSS 2\par NILS 1\par JIMBO 16

## 2023-07-06 NOTE — LETTER BODY
[Dear  ___] : Dear  [unfilled], [Consult Letter:] : I had the pleasure of evaluating your patient, [unfilled]. [Please see my note below.] : Please see my note below. [Consult Closing:] : Thank you very much for allowing me to participate in the care of this patient.  If you have any questions, please do not hesitate to contact me. [FreeTextEntry3] : Best Regards, \par \par Esperanza Villasenor MD\par

## 2023-07-06 NOTE — PHYSICAL EXAM
[General Appearance - Well Developed] : well developed [General Appearance - Well Nourished] : well nourished [Normal Appearance] : normal appearance [Well Groomed] : well groomed [General Appearance - In No Acute Distress] : no acute distress [Edema] : no peripheral edema [Respiration, Rhythm And Depth] : normal respiratory rhythm and effort [Exaggerated Use Of Accessory Muscles For Inspiration] : no accessory muscle use [Abdomen Soft] : soft [Abdomen Tenderness] : non-tender [Abdomen Mass (___ Cm)] : no abdominal mass palpated [Abdomen Hernia] : no hernia was discovered [Costovertebral Angle Tenderness] : no ~M costovertebral angle tenderness [Urethral Meatus] : meatus normal [Penis Abnormality] : normal uncircumcised penis [Urinary Bladder Findings] : the bladder was normal on palpation [Scrotum] : the scrotum was normal [Epididymis] : the epididymides were normal [Testes Tenderness] : no tenderness of the testes [Testes Mass (___cm)] : there were no testicular masses [Normal Station and Gait] : the gait and station were normal for the patient's age [] : no rash [No Focal Deficits] : no focal deficits [Oriented To Time, Place, And Person] : oriented to person, place, and time [Affect] : the affect was normal [Mood] : the mood was normal [Not Anxious] : not anxious [FreeTextEntry1] : There sre 2 areas of palpable fibrosis, 1 cm proximal to the corona ~1.5 cm and dense but not hard, which corresponds to the distal area of decreased diameter he sees with erection, and 2 cm distal to the peno-pubic junction at the base of the penis, 2 cm and again firm but not hard.

## 2023-07-07 ENCOUNTER — NON-APPOINTMENT (OUTPATIENT)
Age: 38
End: 2023-07-07

## 2023-07-07 RX ORDER — TADALAFIL 5 MG/1
5 TABLET ORAL
Qty: 30 | Refills: 3 | Status: ACTIVE | COMMUNITY
Start: 2023-07-07 | End: 1900-01-01

## 2023-07-11 ENCOUNTER — NON-APPOINTMENT (OUTPATIENT)
Age: 38
End: 2023-07-11

## 2023-07-26 ENCOUNTER — APPOINTMENT (OUTPATIENT)
Dept: UROLOGY | Facility: CLINIC | Age: 38
End: 2023-07-26

## 2023-07-27 ENCOUNTER — APPOINTMENT (OUTPATIENT)
Dept: UROLOGY | Facility: CLINIC | Age: 38
End: 2023-07-27
Payer: MEDICAID

## 2023-07-27 VITALS — DIASTOLIC BLOOD PRESSURE: 84 MMHG | SYSTOLIC BLOOD PRESSURE: 130 MMHG | HEART RATE: 80 BPM | OXYGEN SATURATION: 97 %

## 2023-07-27 DIAGNOSIS — N47.1 PHIMOSIS: ICD-10-CM

## 2023-07-27 DIAGNOSIS — N48.6 INDURATION PENIS PLASTICA: ICD-10-CM

## 2023-07-27 DIAGNOSIS — N52.01 ERECTILE DYSFUNCTION DUE TO ARTERIAL INSUFFICIENCY: ICD-10-CM

## 2023-07-27 PROCEDURE — 93980 PENILE VASCULAR STUDY: CPT

## 2023-07-27 PROCEDURE — 99213 OFFICE O/P EST LOW 20 MIN: CPT | Mod: 25

## 2023-07-27 PROCEDURE — J3490T: CUSTOM | Mod: NC

## 2023-07-27 PROCEDURE — 54235 NJX CORPORA CAVERNOSA RX AGT: CPT

## 2023-07-27 NOTE — ASSESSMENT
[FreeTextEntry1] : We reviewed the results of the penile duplex ultrasound today which confirmed his Peyronie's disease with 2 areas of fibrosis as noted he also had 2 angulations of the penis as noted each of which measured approximately 20 degrees.  With intracavernous injection stimulation he was able to achieve a full rigid erection which was straight enough to allow penetration.  During this test he was also noted to have phimosis which was not as readily apparent until he achieved his erection.  This caused constriction when the foreskin was retracted and contributed to his difficulties.  Patient was observed in the office for another 2-1/2 hours during which time his erection completely subsided.\par \par These findings indicate Peyronie's disease which would not be a suitable candidate to treat with intracavernous intralesional Xiaflex injections.  I discussed other options with the patient and recommended that he continue on the use of PDE 5 RI medication to maximize blood flow and allow satisfactory sexual activity while at the same time increasing the chance of possible remodeling spontaneously.  I recommended that we reassess the patient in 3 months.  He is amenable with this approach.\par \par As for his phimosis, we also discussed treatment of this chronic problem with circumcision.  I reviewed with him the indications risks alternatives and chances for success with this approach and he would like to consider this or also.  We will readdress this on his next visit.\par \par I also reviewed with this patient the 4-hour rule concerning the use of intracavernous injection therapy.  If he achieves that a another erection during the remainder of the day, we discussed how he should approach this and what techniques and steps he should take to alleviate the erection. Esperanza Villasenor MD\par

## 2023-07-27 NOTE — HISTORY OF PRESENT ILLNESS
[FreeTextEntry1] : 39 YO M seen  7/6/2023 as NPT for Peyronie's Disease. He has noted a change in his penis over the last 3 - 6 months in that it does not get as hard or as large with erections. This has caused him to not be able to enjoy penetrative intercourse (anal with his male partner). He also notes some nodular changes and narrowing in the distal shaft with mild penile angulation, no pain.  He has not tried any medication, either oral or injectable for the PD or for ED. \par \par PAtient is also treated intermittently for TRT and has subsequent elevated RBC production. He treats this either by phlebotomy or by donating blood. He self medicates with ASA 81 mg due to this issue. Patient also uses HCG injections weekly to protect his endogenous T production, ad uses finasteride and Minoxidil for hair growth.\par NKMA, No FH or prostate cancer. \par UA negative\par IPSS 2\par NILS 1\par JIMBO 16\par \par We discussed the physical findings today and how they indicate likely Peyronie's disease.  I discussed options with the patient to maximize his ability to function normally.  I also discussed with the patient a realistic outcome for any therapeutic intervention and that it is impossible to completely reverse any effects on the penis by methods that are available today.  We will maintain our outcome as the  return of the ability to have satisfactory penetrative intercourse.  To this end, I offered to potential paths.  The first 1 is to initiate treatment with tadalafil 5 mg daily to improve erectile rigidity and increased penile blood flow 24 hours a day.  I recommended that he try this for 3 months and then we will reassess.  The second approach would be to proceed now to a penile duplex ultrasound examination to assess in more detail his plaque, erectile curvature, erectile rigidity, and any penile calcification.  We also discussed the possibility of proceeding down both paths at the same time and he would like to do that.  He was instructed that he should cease taking any oral PDE 5 RI medication for 1 week prior to having the duplex ultrasound examination performed.  Medication was ordered and plans were made for him to come for this test in the meanwhile we initiated therapy with the tadalafil 5 mg daily at this point.  We reviewed together the indications risks alternatives and chances for success with each of these approaches individually and collectively in detail. \par \par Patient seen TODAY 7/27/2023 for penile DUS \par 11:12 TRI/STD (5.88) 0.10 ml \par 11:17 Response 3, \par 20 degree angle to right at proximal plaque 3.5 cm distal to base\par 20 degree angle to left at distal plaque 1 cm proximal to corona\par Patient also has a symptomatic phimosis which is more prominent in the erect state.\par 1:15 Response 3\par 1:45 Detumescence\par \par scrotal 1.8 + 1.4 cm mid secion, 11.8 right, 6 left

## 2023-07-27 NOTE — PHYSICAL EXAM
[General Appearance - Well Developed] : well developed [General Appearance - Well Nourished] : well nourished [Normal Appearance] : normal appearance [Well Groomed] : well groomed [General Appearance - In No Acute Distress] : no acute distress [Abdomen Soft] : soft [Abdomen Tenderness] : non-tender [Abdomen Mass (___ Cm)] : no abdominal mass palpated [Abdomen Hernia] : no hernia was discovered [Costovertebral Angle Tenderness] : no ~M costovertebral angle tenderness [Urethral Meatus] : meatus normal [Penis Abnormality] : normal uncircumcised penis [Urinary Bladder Findings] : the bladder was normal on palpation [Scrotum] : the scrotum was normal [Epididymis] : the epididymides were normal [Testes Tenderness] : no tenderness of the testes [Testes Mass (___cm)] : there were no testicular masses [Edema] : no peripheral edema [] : no respiratory distress [Respiration, Rhythm And Depth] : normal respiratory rhythm and effort [Exaggerated Use Of Accessory Muscles For Inspiration] : no accessory muscle use [Oriented To Time, Place, And Person] : oriented to person, place, and time [Affect] : the affect was normal [Mood] : the mood was normal [Not Anxious] : not anxious [Normal Station and Gait] : the gait and station were normal for the patient's age [No Focal Deficits] : no focal deficits [FreeTextEntry1] : There sre 2 areas of palpable fibrosis, 1 cm proximal to the corona ~1.5 cm and dense but not hard, which corresponds to the distal area of decreased diameter he sees with erection, and 2 cm distal to the peno-pubic junction at the base of the penis, 2 cm and again firm but not hard.

## 2023-07-27 NOTE — LETTER BODY
[Dear  ___] : Dear  [unfilled], [Courtesy Letter:] : I had the pleasure of seeing your patient, [unfilled], in my office today. [Please see my note below.] : Please see my note below. [Consult Closing:] : Thank you very much for allowing me to participate in the care of this patient.  If you have any questions, please do not hesitate to contact me. [FreeTextEntry3] : Best Regards, \par \par Esperanza Villasenor MD\par

## 2023-08-08 ENCOUNTER — OUTPATIENT (OUTPATIENT)
Dept: OUTPATIENT SERVICES | Facility: HOSPITAL | Age: 38
LOS: 1 days | End: 2023-08-08
Payer: MEDICAID

## 2023-08-08 DIAGNOSIS — D45 POLYCYTHEMIA VERA: ICD-10-CM

## 2023-08-08 PROCEDURE — 99195 PHLEBOTOMY: CPT

## 2023-08-29 ENCOUNTER — TRANSCRIPTION ENCOUNTER (OUTPATIENT)
Age: 38
End: 2023-08-29

## 2023-08-29 NOTE — ASU PATIENT PROFILE, ADULT - FALL HARM RISK - UNIVERSAL INTERVENTIONS
Bed in lowest position, wheels locked, appropriate side rails in place/Call bell, personal items and telephone in reach/Instruct patient to call for assistance before getting out of bed or chair/Non-slip footwear when patient is out of bed/[place] to call system/Physically safe environment - no spills, clutter or unnecessary equipment/Purposeful Proactive Rounding/Room/bathroom lighting operational, light cord in reach

## 2023-08-29 NOTE — ASU PATIENT PROFILE, ADULT - NS PREOP UNDERSTANDS INFO
Spoke to patient to be NPO/NO solid foods after  2100 pm tonight.  Allow to drink water or apple juice till 12MN. dress comfortable,   leave al valuable at home, bring ID photo and insurance cards,  escort arranged , address and telephone  given to patient/yes

## 2023-08-30 ENCOUNTER — TRANSCRIPTION ENCOUNTER (OUTPATIENT)
Age: 38
End: 2023-08-30

## 2023-08-30 ENCOUNTER — RESULT REVIEW (OUTPATIENT)
Age: 38
End: 2023-08-30

## 2023-08-30 ENCOUNTER — OUTPATIENT (OUTPATIENT)
Dept: OUTPATIENT SERVICES | Facility: HOSPITAL | Age: 38
LOS: 1 days | Discharge: HOME | End: 2023-08-30
Payer: MEDICAID

## 2023-08-30 ENCOUNTER — APPOINTMENT (OUTPATIENT)
Dept: UROLOGY | Facility: AMBULATORY SURGERY CENTER | Age: 38
End: 2023-08-30

## 2023-08-30 VITALS
OXYGEN SATURATION: 99 % | SYSTOLIC BLOOD PRESSURE: 126 MMHG | DIASTOLIC BLOOD PRESSURE: 78 MMHG | RESPIRATION RATE: 17 BRPM | HEART RATE: 86 BPM | TEMPERATURE: 98 F

## 2023-08-30 VITALS
HEART RATE: 71 BPM | SYSTOLIC BLOOD PRESSURE: 109 MMHG | HEIGHT: 71 IN | OXYGEN SATURATION: 99 % | RESPIRATION RATE: 16 BRPM | DIASTOLIC BLOOD PRESSURE: 75 MMHG | TEMPERATURE: 98 F | WEIGHT: 159.61 LBS

## 2023-08-30 PROCEDURE — 54161 CIRCUM 28 DAYS OR OLDER: CPT

## 2023-08-30 PROCEDURE — 88304 TISSUE EXAM BY PATHOLOGIST: CPT | Mod: 26

## 2023-08-30 RX ORDER — MORPHINE SULFATE 50 MG/1
2 CAPSULE, EXTENDED RELEASE ORAL
Refills: 0 | Status: DISCONTINUED | OUTPATIENT
Start: 2023-08-30 | End: 2023-08-30

## 2023-08-30 RX ORDER — SODIUM CHLORIDE 9 MG/ML
500 INJECTION, SOLUTION INTRAVENOUS
Refills: 0 | Status: DISCONTINUED | OUTPATIENT
Start: 2023-08-30 | End: 2023-08-30

## 2023-08-30 RX ORDER — FINASTERIDE 5 MG/1
1 TABLET, FILM COATED ORAL
Refills: 0 | DISCHARGE

## 2023-08-30 RX ORDER — ACETAMINOPHEN 500 MG
650 TABLET ORAL ONCE
Refills: 0 | Status: COMPLETED | OUTPATIENT
Start: 2023-08-30 | End: 2023-08-30

## 2023-08-30 RX ADMIN — MORPHINE SULFATE 2 MILLIGRAM(S): 50 CAPSULE, EXTENDED RELEASE ORAL at 09:15

## 2023-08-30 RX ADMIN — Medication 650 MILLIGRAM(S): at 10:46

## 2023-08-30 RX ADMIN — MORPHINE SULFATE 2 MILLIGRAM(S): 50 CAPSULE, EXTENDED RELEASE ORAL at 09:10

## 2023-08-30 RX ADMIN — Medication 650 MILLIGRAM(S): at 11:16

## 2023-08-30 NOTE — ASU DISCHARGE PLAN (ADULT/PEDIATRIC) - ASU DC SPECIAL INSTRUCTIONSFT
CIRCUMCISION    STITCHES: The stitches in the incision will dissolve and fall out by themselves. Sometimes skin stitches may open, allowing a slight gaping of the incision. This is no problem if you keep the area clean. You may apply Bacitracin (available over the counter) or Vaseline to the incision 3 times a day for the first week. Any “black and blue” bruising areas are expected and will also resolve over weeks.  You may apply an ice-pack for 15 minutes out of every hour for the first 24 -36 hours to minimize pain and swelling.    PAIN: You may have some intermittent pain for up to six (6) weeks post operatively. Pain does not signify any problem unless associated with fever, chills, or inability to void.  If you experience any fevers or chills please call immediately as this may be signs of an infection. You may take Tylenol (acetaminophen) 650-975mg and/or Motrin (ibuprofen) 400-600mg, both available over the counter, for pain every 6 hours as needed. Do not exceed 4000mg of Tylenol (acetaminophen) daily. You may alternate these medications such that you take one or the other every 3 hours for around the clock pain coverage.    STOOL SOFTENERS: Do not allow yourself to become constipated as straining may cause bleeding. Take stool softeners or a laxative (ex. Miralax, Colace, Senokot, ExLax, etc), available over the counter, if needed.    BATHING: You may shower 48 hours after surgery, but minimize water to the surgical incision and drain.    DIET: You may resume your regular diet and regular medication regimen.    ACTIVITY: No heavy lifting or strenuous exercise until you are evaluated at your post-operative appointment. Otherwise, you may return to your usual level of physical activity.    FOLLOW-UP: Please follow up with Dr. Villasenor in 2 weeks. If you did not already schedule your post-operative appointment, please call your urologist to schedule and follow-up appointment.    CALL YOUR UROLOGIST IF: You have any bleeding that does not stop, inability to void >8 hours, fever over 100.4 F, chills, persistent nausea/vomiting, changes in your incision concerning for infection, or if your pain is not controlled on your discharge pain medications.

## 2023-08-30 NOTE — ASU DISCHARGE PLAN (ADULT/PEDIATRIC) - CARE PROVIDER_API CALL
Esperanza Villasenor  Urology  [address] [place] [address], [address] [address]  Phone: [phone]  Fax: [phone]  Follow Up Time: 2 weeks

## 2023-08-30 NOTE — ASU DISCHARGE PLAN (ADULT/PEDIATRIC) - NS MD DC FALL RISK RISK
For information on Fall & Injury Prevention, visit: [email or web address] OR  [email or web address] OR  https://www.cdc.gov/steadi/patient.html

## 2023-08-30 NOTE — PRE-ANESTHESIA EVALUATION ADULT - NSANTHOSAYNRD_GEN_A_CORE
No. LUCINDA screening performed.  STOP BANG Legend: 0-2 = LOW Risk; 3-4 = INTERMEDIATE Risk; 5-8 = HIGH Risk

## 2023-09-05 LAB — SURGICAL PATHOLOGY STUDY: SIGNIFICANT CHANGE UP

## 2023-09-08 ENCOUNTER — OUTPATIENT (OUTPATIENT)
Dept: OUTPATIENT SERVICES | Facility: HOSPITAL | Age: 38
LOS: 1 days | End: 2023-09-08
Payer: MEDICAID

## 2023-09-08 DIAGNOSIS — D45 POLYCYTHEMIA VERA: ICD-10-CM

## 2023-09-08 PROCEDURE — 99195 PHLEBOTOMY: CPT

## 2023-09-09 NOTE — HISTORY OF PRESENT ILLNESS
[FreeTextEntry1] : 39 YO M seen 7/6/2023 as NPT for Peyronie's Disease. He has noted a change in his penis over the last 3 - 6 months in that it does not get as hard or as large with erections. This has caused him to not be able to enjoy penetrative intercourse (anal with his male partner). He also notes some nodular changes and narrowing in the distal shaft with mild penile angulation, no pain.  He has not tried any medication, either oral or injectable for the PD or for ED.   PAtient is also treated intermittently for TRT and has subsequent elevated RBC production. He treats this either by phlebotomy or by donating blood. He self medicates with ASA 81 mg due to this issue. Patient also uses HCG injections weekly to protect his endogenous T production, ad uses finasteride and Minoxidil for hair growth. NKMA, No FH or prostate cancer.  UA negative IPSS 2 NILS 1 JIMBO 16  We discussed the physical findings today and how they indicate likely Peyronie's disease.  I discussed options with the patient to maximize his ability to function normally.  I also discussed with the patient a realistic outcome for any therapeutic intervention and that it is impossible to completely reverse any effects on the penis by methods that are available today.  We will maintain our outcome as the  return of the ability to have satisfactory penetrative intercourse.  To this end, I offered to potential paths.  The first 1 is to initiate treatment with tadalafil 5 mg daily to improve erectile rigidity and increased penile blood flow 24 hours a day.  I recommended that he try this for 3 months and then we will reassess.  The second approach would be to proceed now to a penile duplex ultrasound examination to assess in more detail his plaque, erectile curvature, erectile rigidity, and any penile calcification.  We also discussed the possibility of proceeding down both paths at the same time and he would like to do that.  He was instructed that he should cease taking any oral PDE 5 RI medication for 1 week prior to having the duplex ultrasound examination performed.  Medication was ordered and plans were made for him to come for this test in the meanwhile we initiated therapy with the tadalafil 5 mg daily at this point.  We reviewed together the indications risks alternatives and chances for success with each of these approaches individually and collectively in detail.   Patient seen 7/27/2023 for penile DUS  11:12 TRI/STD (5.88) 0.10 ml  11:17 Response 3,  20 degree angle to right at proximal plaque 3.5 cm distal to base 20 degree angle to left at distal plaque 1 cm proximal to corona Patient also has a symptomatic phimosis which is more prominent in the erect state. 1:15 Response 3 1:45 Detumescence  scrotal 1.8 + 1.4 cm mid secion, 11.8 right, 6 left  We reviewed the results of the penile duplex ultrasound today which confirmed his Peyronie's disease with 2 areas of fibrosis as noted he also had 2 angulations of the penis as noted each of which measured approximately 20 degrees.  With intracavernous injection stimulation he was able to achieve a full rigid erection which was straight enough to allow penetration.  During this test he was also noted to have phimosis which was not as readily apparent until he achieved his erection.  This caused constriction when the foreskin was retracted and contributed to his difficulties.  Patient was observed in the office for another 2-1/2 hours during which time his erection completely subsided.  These findings indicate Peyronie's disease which would not be a suitable candidate to treat with intracavernous intralesional Xiaflex injections.  I discussed other options with the patient and recommended that he continue on the use of PDE 5 RI medication to maximize blood flow and allow satisfactory sexual activity while at the same time increasing the chance of possible remodeling spontaneously.  I recommended that we reassess the patient in 3 months.  He is amenable with this approach.  As for his phimosis, we also discussed treatment of this chronic problem with circumcision.  I reviewed with him the indications risks alternatives and chances for success with this approach and he would like to consider this or also.  We will readdress this on his next visit.  I also reviewed with this patient the 4-hour rule concerning the use of intracavernous injection therapy.  If he achieves that a another erection during the remainder of the day, we discussed how he should approach this and what techniques and steps he should take to alleviate the erection.  Patient seen TODAY 9/13/2023 for post-op visit s/p circumcision 0n 8/30/23.

## 2023-09-13 ENCOUNTER — RESULT CHARGE (OUTPATIENT)
Age: 38
End: 2023-09-13

## 2023-09-13 ENCOUNTER — APPOINTMENT (OUTPATIENT)
Dept: UROLOGY | Facility: CLINIC | Age: 38
End: 2023-09-13
Payer: MEDICAID

## 2023-09-13 VITALS
OXYGEN SATURATION: 96 % | DIASTOLIC BLOOD PRESSURE: 76 MMHG | HEIGHT: 71 IN | TEMPERATURE: 98.24 F | HEART RATE: 71 BPM | WEIGHT: 160 LBS | SYSTOLIC BLOOD PRESSURE: 121 MMHG | BODY MASS INDEX: 22.4 KG/M2

## 2023-09-13 PROCEDURE — 99024 POSTOP FOLLOW-UP VISIT: CPT

## 2023-10-10 ENCOUNTER — OUTPATIENT (OUTPATIENT)
Dept: OUTPATIENT SERVICES | Facility: HOSPITAL | Age: 38
LOS: 1 days | End: 2023-10-10
Payer: MEDICAID

## 2023-10-10 DIAGNOSIS — D45 POLYCYTHEMIA VERA: ICD-10-CM

## 2023-10-10 PROCEDURE — 99195 PHLEBOTOMY: CPT

## 2023-10-26 ENCOUNTER — APPOINTMENT (OUTPATIENT)
Dept: UROLOGY | Facility: CLINIC | Age: 38
End: 2023-10-26

## 2023-10-31 ENCOUNTER — NON-APPOINTMENT (OUTPATIENT)
Age: 38
End: 2023-10-31

## 2023-11-09 ENCOUNTER — OUTPATIENT (OUTPATIENT)
Dept: OUTPATIENT SERVICES | Facility: HOSPITAL | Age: 38
LOS: 1 days | End: 2023-11-09
Payer: MEDICAID

## 2023-11-09 ENCOUNTER — APPOINTMENT (OUTPATIENT)
Dept: INFUSION THERAPY | Facility: CLINIC | Age: 38
End: 2023-11-09

## 2023-11-09 DIAGNOSIS — D45 POLYCYTHEMIA VERA: ICD-10-CM

## 2023-11-09 PROCEDURE — 99195 PHLEBOTOMY: CPT

## 2023-12-21 ENCOUNTER — OUTPATIENT (OUTPATIENT)
Dept: OUTPATIENT SERVICES | Facility: HOSPITAL | Age: 38
LOS: 1 days | End: 2023-12-21
Payer: MEDICAID

## 2023-12-21 ENCOUNTER — APPOINTMENT (OUTPATIENT)
Dept: INFUSION THERAPY | Facility: CLINIC | Age: 38
End: 2023-12-21

## 2023-12-21 DIAGNOSIS — D45 POLYCYTHEMIA VERA: ICD-10-CM

## 2023-12-21 PROCEDURE — 99195 PHLEBOTOMY: CPT

## 2024-01-23 ENCOUNTER — APPOINTMENT (OUTPATIENT)
Dept: INFUSION THERAPY | Facility: CLINIC | Age: 39
End: 2024-01-23

## (undated) DEVICE — WARMING BLANKET UPPER ADULT

## (undated) DEVICE — GLV 8.5 PROTEXIS (WHITE)

## (undated) DEVICE — SUT CHROMIC 3-0 27" SH

## (undated) DEVICE — DRAPE TOWEL BLUE 17" X 24"

## (undated) DEVICE — GLV 9 PROTEXIS (WHITE)

## (undated) DEVICE — SUPP ATHLETIC MALE XLG 44-55IN

## (undated) DEVICE — DRSG KERLIX ROLL 4.5"

## (undated) DEVICE — PACK GENERAL MINOR

## (undated) DEVICE — VENODYNE/SCD SLEEVE CALF MEDIUM